# Patient Record
Sex: FEMALE | Race: WHITE
[De-identification: names, ages, dates, MRNs, and addresses within clinical notes are randomized per-mention and may not be internally consistent; named-entity substitution may affect disease eponyms.]

---

## 2018-03-30 ENCOUNTER — HOSPITAL ENCOUNTER (EMERGENCY)
Dept: HOSPITAL 26 - MED | Age: 28
Discharge: HOME | End: 2018-03-30
Payer: MEDICAID

## 2018-03-30 VITALS — DIASTOLIC BLOOD PRESSURE: 77 MMHG | SYSTOLIC BLOOD PRESSURE: 132 MMHG

## 2018-03-30 VITALS — DIASTOLIC BLOOD PRESSURE: 65 MMHG | SYSTOLIC BLOOD PRESSURE: 108 MMHG

## 2018-03-30 VITALS — WEIGHT: 110 LBS | BODY MASS INDEX: 18.33 KG/M2 | HEIGHT: 65 IN

## 2018-03-30 DIAGNOSIS — F41.9: Primary | ICD-10-CM

## 2018-03-30 DIAGNOSIS — Z88.8: ICD-10-CM

## 2018-03-30 DIAGNOSIS — K21.9: ICD-10-CM

## 2018-03-30 NOTE — NUR
DC HOME INSTRUCTIONS GIVEN TO PT AND MOTHER

Patient discharged with v/s stable. Written and verbal after care instructions 
given and explained. 

Patient alert, oriented and verbalized understanding of instructions. 
Ambulatory with steady gait. All questions addressed prior to discharge. ID 
band removed. Patient advised to follow up with PMD. Rx of PEPCID given. 
Patient educated on indication of medication including possible reaction and 
side effects. Opportunity to ask questions provided and answered.

## 2018-03-30 NOTE — NUR
PATIENT PRESENTS TO ED WITH ANTERIOR CHEST WALL PAIN RADIATING STRAIGHT BACK--- 
. 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 
THIS TIME] . DENIES N/V/D; SKIN IS PINK/WARM/DRY; AAOX4 WITH EVEN AND STEADY 
GAIT; LUNGS CLEAR BL; HR EVEN AND REGULAR; PT DENIES ANY FEVER,  OR COUGH AT 
THIS TIME; PATIENT STATES PAIN OF 0/10 AT THIS TIME; VSS; PATIENT POSITIONED 
FOR COMFORT; ER MD MADE AWARE OF PT STATUS.

## 2018-10-26 ENCOUNTER — HOSPITAL ENCOUNTER (EMERGENCY)
Dept: HOSPITAL 26 - MED | Age: 28
Discharge: HOME | End: 2018-10-26
Payer: MEDICAID

## 2018-10-26 VITALS — DIASTOLIC BLOOD PRESSURE: 67 MMHG | SYSTOLIC BLOOD PRESSURE: 108 MMHG

## 2018-10-26 VITALS — SYSTOLIC BLOOD PRESSURE: 113 MMHG | DIASTOLIC BLOOD PRESSURE: 66 MMHG

## 2018-10-26 VITALS — HEIGHT: 66 IN | WEIGHT: 120 LBS | BODY MASS INDEX: 19.29 KG/M2

## 2018-10-26 DIAGNOSIS — F41.9: ICD-10-CM

## 2018-10-26 DIAGNOSIS — Z88.6: ICD-10-CM

## 2018-10-26 DIAGNOSIS — R11.10: ICD-10-CM

## 2018-10-26 DIAGNOSIS — E73.9: Primary | ICD-10-CM

## 2018-10-26 LAB
APPEARANCE UR: CLEAR
BARBITURATES UR QL SCN: (no result) NG/ML
BENZODIAZ UR QL SCN: (no result) NG/ML
BILIRUB UR QL STRIP: NEGATIVE
BZE UR QL SCN: (no result) NG/ML
CANNABINOIDS UR QL SCN: (no result) NG/ML
COLOR UR: YELLOW
GLUCOSE UR STRIP-MCNC: NEGATIVE MG/DL
HGB UR QL STRIP: (no result)
LEUKOCYTE ESTERASE UR QL STRIP: NEGATIVE
NITRITE UR QL STRIP: NEGATIVE
OPIATES UR QL SCN: (no result) NG/ML
PCP UR QL SCN: (no result) NG/ML
PH UR STRIP: 6 [PH] (ref 5–9)
RBC #/AREA URNS HPF: (no result) /HPF (ref 0–5)
WBC,URINE: (no result) /HPF (ref 0–5)

## 2018-10-26 PROCEDURE — 81025 URINE PREGNANCY TEST: CPT

## 2018-10-26 PROCEDURE — 80305 DRUG TEST PRSMV DIR OPT OBS: CPT

## 2018-10-26 PROCEDURE — 81001 URINALYSIS AUTO W/SCOPE: CPT

## 2018-10-26 PROCEDURE — 99284 EMERGENCY DEPT VISIT MOD MDM: CPT
